# Patient Record
Sex: FEMALE
[De-identification: names, ages, dates, MRNs, and addresses within clinical notes are randomized per-mention and may not be internally consistent; named-entity substitution may affect disease eponyms.]

---

## 2018-05-02 ENCOUNTER — RX ONLY (OUTPATIENT)
Age: 38
Setting detail: RX ONLY
End: 2018-05-02

## 2018-05-02 ENCOUNTER — APPOINTMENT (RX ONLY)
Dept: URBAN - METROPOLITAN AREA CLINIC 156 | Facility: CLINIC | Age: 38
Setting detail: DERMATOLOGY
End: 2018-05-02

## 2018-05-02 DIAGNOSIS — Z41.9 ENCOUNTER FOR PROCEDURE FOR PURPOSES OTHER THAN REMEDYING HEALTH STATE, UNSPECIFIED: ICD-10-CM

## 2018-05-02 PROCEDURE — ? COSMETIC CONSULTATION: SKIN CARE PRODUCTS AND SERVICES

## 2018-05-02 PROCEDURE — ? COSMETIC CONSULTATION - MICRO-NEEDLING

## 2018-05-02 PROCEDURE — ? COSMETIC CONSULTATION: SKIN TIGHTENING

## 2018-05-02 PROCEDURE — ? COSMETIC CONSULTATION: LASER RESURFACING

## 2018-05-02 PROCEDURE — ? COSMETIC CONSULTATION: PALOMAR LASER

## 2018-05-02 RX ORDER — ACYCLOVIR 400 MG/1
TABLET ORAL
Qty: 14 | Refills: 0 | Status: ERX | COMMUNITY
Start: 2018-05-02

## 2018-05-02 RX ORDER — MINOCYCLINE HYDROCHLORIDE 100 MG/1
TABLET ORAL
Qty: 28 | Refills: 0 | Status: ERX | COMMUNITY
Start: 2018-05-02

## 2018-05-04 ENCOUNTER — APPOINTMENT (RX ONLY)
Dept: URBAN - METROPOLITAN AREA CLINIC 154 | Facility: CLINIC | Age: 38
Setting detail: DERMATOLOGY
End: 2018-05-04

## 2018-05-04 DIAGNOSIS — Z41.9 ENCOUNTER FOR PROCEDURE FOR PURPOSES OTHER THAN REMEDYING HEALTH STATE, UNSPECIFIED: ICD-10-CM

## 2018-05-04 PROCEDURE — ? FRAXEL

## 2018-05-04 PROCEDURE — ? FRACTIONAL ERBIUM ABLATIVE LASER

## 2018-05-04 ASSESSMENT — LOCATION DETAILED DESCRIPTION DERM
LOCATION DETAILED: RIGHT INFERIOR CENTRAL MALAR CHEEK
LOCATION DETAILED: LEFT CENTRAL MALAR CHEEK
LOCATION DETAILED: LEFT MID TEMPLE
LOCATION DETAILED: RIGHT MID TEMPLE

## 2018-05-04 ASSESSMENT — LOCATION ZONE DERM: LOCATION ZONE: FACE

## 2018-05-04 ASSESSMENT — LOCATION SIMPLE DESCRIPTION DERM
LOCATION SIMPLE: RIGHT TEMPLE
LOCATION SIMPLE: LEFT TEMPLE
LOCATION SIMPLE: LEFT CHEEK
LOCATION SIMPLE: RIGHT CHEEK

## 2018-05-04 NOTE — PROCEDURE: FRACTIONAL ERBIUM ABLATIVE LASER
Number Of Passes: 4
Pre-Procedure Text: After consent was obtained and the procedure was explained, all persons present in the exam room put on their protective eyewear. Cold gel was applied to the treatment areas.
Spot Size: groove tip
Energy In Mj/Cm2: 5/0/0-5.5/3/5.5
Procedural Text: The treatment areas where then treated as noted above.
Post-Procedure Text: Following the treatment, ice, aquaphor, and sunblock was applied to the treatment areas.  Post-care instructions were discussed.
Treatment Site: cheeks, temples
Detail Level: Zone
Post-Care Instructions: I reviewed with the patient in detail post-care instructions. Patient is to apply vaseline with a q-tip to all crusted areas, and avoid picking at any scabs. Pt should stay away from the sun and wear sun protection until fully healed.
Energy In Mj/Cm2: 9/0/0 9/3/5
Spot Size: blue tip
External Cooling Fan Speed: 5
Consent: Written consent obtained, risks reviewed including but not limited to crusting, scabbing, blistering, scarring, darker or lighter pigmentary change, and/or incomplete removal.

## 2018-05-04 NOTE — PROCEDURE: FRAXEL
Post-Care Instructions: Treatment area washed with gentle cleanser after treatment.  SPF 30 applied post treatment.  Cool gel pack provided to patient. \\nI reviewed with the patient in detail post-care instructions. Patient should avoid sun until area fully healed.
Treatment Number: 1
External Cooling: [place] Cryo 5
Energy(Mj/Cm2): 0
Length Of Topical Anesthesia Application (Optional): 90 minutes
Energy(Mj/Cm2): 30
Consent: Written consent obtained, risks reviewed including but not limited to pain and incomplete improvement.\\n\\nPatient has not used any retinoids or glycolic acid in last 2 weeks, has not used a depilatory cream or wad in 2 weeks, has not used Accutane in last 6 months.  \\n\\nLaser Check pre-procedure:\\nOptical window on laser hand piece clean with no scratches, burns, pits or debris.\\nOptical window on treatment tip clean with no scratches, pits, or debris.
Number Of Passes: 4
Indication: resurfacing
Location: full face except eyelids
External Cooling Fan Speed: 5
Add Post-Care Below To The Note: No
Anesthesia Type: 1% lidocaine with epinephrine
Topical Anesthesia Type: 23% Lidocaine 7% Tetracaine
Detail Level: Simple
Wavelength: 1550nm

## 2018-05-23 ENCOUNTER — RX ONLY (OUTPATIENT)
Age: 38
Setting detail: RX ONLY
End: 2018-05-23

## 2018-05-23 RX ORDER — MINOCYCLINE HYDROCHLORIDE 100 MG/1
TABLET ORAL
Qty: 28 | Refills: 0 | Status: ERX

## 2024-02-05 ENCOUNTER — APPOINTMENT (RX ONLY)
Dept: URBAN - METROPOLITAN AREA CLINIC 89 | Facility: CLINIC | Age: 44
Setting detail: DERMATOLOGY
End: 2024-02-05